# Patient Record
Sex: FEMALE
[De-identification: names, ages, dates, MRNs, and addresses within clinical notes are randomized per-mention and may not be internally consistent; named-entity substitution may affect disease eponyms.]

---

## 2018-11-29 ENCOUNTER — HOSPITAL ENCOUNTER (EMERGENCY)
Dept: HOSPITAL 25 - UCEAST | Age: 53
Discharge: HOME | End: 2018-11-29
Payer: COMMERCIAL

## 2018-11-29 DIAGNOSIS — S61.211A: Primary | ICD-10-CM

## 2018-11-29 DIAGNOSIS — Z23: ICD-10-CM
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
## 2018-11-29 PROCEDURE — 12001 RPR S/N/AX/GEN/TRNK 2.5CM/<: CPT

## 2018-11-29 PROCEDURE — 99201: CPT

## 2018-11-29 PROCEDURE — 90715 TDAP VACCINE 7 YRS/> IM: CPT

## 2018-11-29 PROCEDURE — 90471 IMMUNIZATION ADMIN: CPT

## 2018-11-29 PROCEDURE — G0463 HOSPITAL OUTPT CLINIC VISIT: HCPCS

## 2018-11-29 NOTE — UC
Laceration HPI





- HPI Summary


HPI Summary: 


Patient is a 52-year-old female who presents to the  for a finger laceration 

that occurred this evening.  Patient states she was using an "eyebrow knife" 

when it accidentally slipped and cut distal second digit of left hand.  Patient 

is unaware of her last tetanus immunization.  Symptoms are mild in severity.  

Touching affected area makes symptoms worse.  Rest makes symptoms better.








- History Of Current Complaint


Stated Complaint: FINGER LACERATION


Time Seen by Provider: 11/29/18 21:34


Hx Obtained From: Patient





- Allergies/Home Medications


Allergies/Adverse Reactions: 


 Allergies











Allergy/AdvReac Type Severity Reaction Status Date / Time


 


No Known Allergies Allergy   Verified 11/29/18 22:02











Home Medications: 


 Home Medications





NK [No Home Medications Reported]  11/29/18 [History Confirmed 11/29/18]











PMH/Surg Hx/FS Hx/Imm Hx


Previously Healthy: Yes





- Family History


Known Family History: Positive: Non-Contributory





- Social History


Occupation: Unemployed


Lives: With Family





Review of Systems


All Other Systems Reviewed And Are Negative: Yes


Skin: Positive: Other - laceration to left second digit of hand


Is Patient Immunocompromised?: No





Physical Exam


Triage Information Reviewed: Yes


Appearance: Well-Appearing - Pt. sitting in chair in NAD.  present.


Vital Signs Reviewed: Yes


Eyes: Positive: Conjunctiva Clear


Neck: Positive: Supple


Musculoskeletal: Positive: Other: - 1cm full thickness laceration noted to the 

distal aspect of the 2nd digit of left hand on the volar aspect. Mild active 

bleeding. Full ROM of digit.


Neurological Exam: Normal


Psychological Exam: Normal


Skin Exam: Normal - laceration as noted above.





Laceration Repair





- Laceration Repair


  ** 1


Description: Linear


Laceration Size After Repair: Length (cm) - 1cm


Modified For Repair: No


Type Injection: Local


Anesthesia Used: 2.0% Lido


Cleansing Completed Via Routine Prep: Yes


Irrigation With Pressure Irrigation Device: No


Closure Material: Sutures - 3 5-0


Closure Method: Single Layer


Suture Of: Skin


Suture Type: Nylon





Laceration Course/Dx





- Course/Dx


Course Of Treatment: Pt. presenting for a simple finger laceration.  Tetanus 

was updated.  Laceration repaired as noted above.  Suture removal in 7-10 days.

  Keep wound clean and dry.  To return to the urgent care for redness, swelling 

or drainage from wound.  Patient understands and agrees with plan.





- Differential Dx - Laceration/Wound


Differental Diagnoses: Laceration, Tendon Laceration





- Diagnosis


Provider Diagnosis: 


 Finger laceration








Discharge





- Sign-Out/Discharge


Documenting (check all that apply): Patient Departure


All imaging exams completed and their final reports reviewed: No Studies





- Discharge Plan


Condition: Good


Disposition: HOME


Patient Education Materials:  Care For Your Stitches (ED), Laceration (ED)


Referrals: 


[facility] [Outside]


[facility]ERR[facility] [Outside]


Additional Instructions: 


Suture removal in 7-10 days


Keep wound clean and dry


Can use an over the counter antibiotic ointment such as neosporin


Return to  for signs of infection: redness, swelling or drainage from wound 





- Billing Disposition and Condition


Condition: GOOD


Disposition: Home

## 2018-12-07 ENCOUNTER — HOSPITAL ENCOUNTER (EMERGENCY)
Dept: HOSPITAL 25 - UCEAST | Age: 53
Discharge: HOME | End: 2018-12-07
Payer: COMMERCIAL

## 2018-12-07 DIAGNOSIS — S61.211D: Primary | ICD-10-CM
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
## 2018-12-07 NOTE — UC
Laceration HPI





- HPI Summary


HPI Summary: 





54 yo female presents for suture removal. She had three sutures placed on 11/29 

to her left index finger. Has had no issues. No increased pain or swelling. No 

redness or drainage.








- History Of Current Complaint


Chief Complaint: KACIkin


Stated Complaint: FINGER LACERATION


Hx Last Menstrual Period: post


Laceration Location: Finger


Pain Intensity: 0





- Allergies/Home Medications


Allergies/Adverse Reactions: 


 Allergies











Allergy/AdvReac Type Severity Reaction Status Date / Time


 


No Known Allergies Allergy   Verified 12/07/18 18:13














PMH/Surg Hx/FS Hx/Imm Hx





- Additional Past Medical History


Additional PMH: 





None





- Surgical History


Surgical History: None





- Family History


Known Family History: Positive: Non-Contributory





- Social History


Occupation: Employed Full-time


Lives: With Family


Alcohol Use: None


Substance Use Type: None


Smoking Status (MU): Never Smoked Tobacco





- Immunization History


Most Recent Tetanus Shot: unk





Review of Systems


All Other Systems Reviewed And Are Negative: Yes


Constitutional: Positive: Negative


Skin: Positive: Other - 3 sutures in place left index


Respiratory: Positive: Negative


Cardiovascular: Positive: Negative


Neurovascular: Positive: Negative


Neurological: Positive: Negative


Psychological: Positive: Negative





Physical Exam





- Summary


Physical Exam Summary: 





GENERAL: NAD. WDWN. No pain distress.


SKIN: 3 sutures in placed left index finger pad. Well healed. No erythema, 

drainage, or edema.


NECK: Supple. Nontender. No lymphadenopathy. 


CHEST:  No accessory muscle use. Breathing comfortably and in no distress.


CV:  Pulses intact. Cap refill <2seconds


NEURO: Alert.


PSYCH: Age appropriate behavior.





Triage Information Reviewed: Yes


Vital Signs: 


 Initial Vital Signs











Temp  97.6 F   12/07/18 18:10


 


Pulse  73   12/07/18 18:10


 


Resp  16   12/07/18 18:10


 


BP  130/87   12/07/18 18:10


 


Pulse Ox  100   12/07/18 18:10











Vital Signs Reviewed: Yes





Laceration Course/Dx





- Course/Dx


Course Of Treatment: 3 sutures removed without difficulty





- Diagnosis


Provider Diagnosis: 


 Visit for suture removal








Discharge





- Sign-Out/Discharge


Documenting (check all that apply): Patient Departure


All imaging exams completed and their final reports reviewed: No Studies





- Discharge Plan


Condition: Stable


Disposition: HOME


Patient Education Materials:  Stitches Removal (ED)


Referrals: 


No Primary Care Phys,NOPCP [Primary Care Provider] - 


Additional Instructions: 


If you develop a fever, shortness of breath, chest pain, new or worsening 

symptoms - please call your PCP or go to the ED.


 





Your blood pressure was high at todays visit. Please see your primary provider 

within 4 weeks for recheck and re-evaluation.








- Billing Disposition and Condition


Condition: STABLE


Disposition: Home